# Patient Record
Sex: FEMALE | Race: AMERICAN INDIAN OR ALASKA NATIVE
[De-identification: names, ages, dates, MRNs, and addresses within clinical notes are randomized per-mention and may not be internally consistent; named-entity substitution may affect disease eponyms.]

---

## 2018-03-09 ENCOUNTER — HOSPITAL ENCOUNTER (INPATIENT)
Dept: HOSPITAL 5 - LD | Age: 38
LOS: 1 days | Discharge: HOME | DRG: 770 | End: 2018-03-10
Attending: OBSTETRICS & GYNECOLOGY | Admitting: OBSTETRICS & GYNECOLOGY
Payer: COMMERCIAL

## 2018-03-09 DIAGNOSIS — Z37.1: ICD-10-CM

## 2018-03-09 DIAGNOSIS — O03.1: Primary | ICD-10-CM

## 2018-03-09 DIAGNOSIS — Z23: ICD-10-CM

## 2018-03-09 LAB
BUN SERPL-MCNC: 7 MG/DL (ref 7–17)
BUN/CREAT SERPL: 10 %
CALCIUM SERPL-MCNC: 8.7 MG/DL (ref 8.4–10.2)
HCT VFR BLD CALC: 37.9 % (ref 30.3–42.9)
HEMOLYSIS INDEX: 11
HGB BLD-MCNC: 12.4 GM/DL (ref 10.1–14.3)
MCH RBC QN AUTO: 28 PG (ref 28–32)
MCHC RBC AUTO-ENTMCNC: 33 % (ref 30–34)
MCV RBC AUTO: 84 FL (ref 79–97)
PLATELET # BLD: 294 K/MM3 (ref 140–440)
RBC # BLD AUTO: 4.5 M/MM3 (ref 3.65–5.03)

## 2018-03-09 PROCEDURE — 85014 HEMATOCRIT: CPT

## 2018-03-09 PROCEDURE — 86592 SYPHILIS TEST NON-TREP QUAL: CPT

## 2018-03-09 PROCEDURE — 85007 BL SMEAR W/DIFF WBC COUNT: CPT

## 2018-03-09 PROCEDURE — 88305 TISSUE EXAM BY PATHOLOGIST: CPT

## 2018-03-09 PROCEDURE — 84702 CHORIONIC GONADOTROPIN TEST: CPT

## 2018-03-09 PROCEDURE — 85018 HEMOGLOBIN: CPT

## 2018-03-09 PROCEDURE — 85025 COMPLETE CBC W/AUTO DIFF WBC: CPT

## 2018-03-09 PROCEDURE — 86850 RBC ANTIBODY SCREEN: CPT

## 2018-03-09 PROCEDURE — 85027 COMPLETE CBC AUTOMATED: CPT

## 2018-03-09 PROCEDURE — 36415 COLL VENOUS BLD VENIPUNCTURE: CPT

## 2018-03-09 PROCEDURE — 80048 BASIC METABOLIC PNL TOTAL CA: CPT

## 2018-03-09 PROCEDURE — 86900 BLOOD TYPING SEROLOGIC ABO: CPT

## 2018-03-09 PROCEDURE — 86901 BLOOD TYPING SEROLOGIC RH(D): CPT

## 2018-03-09 NOTE — HISTORY AND PHYSICAL REPORT
History of Present Illness


Date of examination: 18


Date of admission: 


18 22:34





Chief complaint: 





something coming out of vagina 


History of present illness: 





This is a 38 yo  at 15 weeks came via ambulance as a walk in. she has a 

fetal sac protruding from her vagina. She stated that she went to her Doc 

office for numbness in right foot and finger numbness. She was directed to ER 

and left without being seen. In her pregnancy history at [place] has been to 

ER for vaginal bleeding dx with subchorionic bleeding. Today she experienced 

cramping and had a bowel movement and noticed something coming out. She has a 

hx of losses at 20 and 6 weeks. 





Past History


Past Medical History: no pertinent history


Past Surgical History: no surgical history


GYN History: chlamydia (in remote past )


Family/Genetic History: cancer


Social history: .  denies: smoking, alcohol abuse, prescription drug 

abuse





- Obstetrical History


: 3





Medications and Allergies


 Allergies











Allergy/AdvReac Type Severity Reaction Status Date / Time


 


No Known Allergies Allergy   Unverified 18 22:57











Active Meds: 


Active Medications





Oxytocin/Sodium Chloride (Pitocin/Ns 30 Unit/500ml)  30,000 milliunits in 500 

mls @ 2 mls/hr IV AS DIRECT ONE; Protocol


   Stop: 18 09:20











Review of Systems


All systems: negative


Genitourinary: other (bulging membranses with fetus intact)


Rectal Exam: deferred





- Physical Exam


Breasts: Positive: normal


Cardiovascular: Regular rate, Normal S1


Lungs: Positive: Clear to auscultation, Normal air movement


Abdomen: Positive: normal appearance, soft, normal bowel sounds.  Negative: 

distention, tenderness, guarding


Genitourinary (Female): Positive: normal external genitalia, normal perenium


Vulva: both: normal


Uterus: Positive: normal size


Anus/Rectum: Positive: normal perianal skin


Extremities: Positive: normal


Deep Tendon Reflex Grade: Normal +2





- Obstetrical


Uterine Contraction Pattern: Irregular


Uterine Tone Measurement Phase: Contraction


Uterine Contraction Intensity: Moderate





Results


Result Diagrams: 


 18 22:01





 18 22:01


 Abnormal lab results











  18 Range/Units





  22:01 22:01 22:01 


 


RDW  21.5 H    (13.2-15.2)  %


 


Carbon Dioxide   21 L   (22-30)  mmol/L


 


Glucose   107 H   ()  mg/dL


 


HCG, Quant    91810 H  (0-4)  mIU/mL








All other labs normal.








Assessment and Plan


A/P IUP 15 weeks


       inevitable miscarriage 


       RPL


       





       IVF, labs


       spoke with on call physicain awaiting records


       consider pitocin for delivery 


       counseled patient regarding the non viable status and inevitable status 


       await exp vaginal delivery 


       understands risk of possible d and c for possible retained placenta

## 2018-03-09 NOTE — EMERGENCY DEPARTMENT REPORT
HPI





- General


Time Seen by Provider: 18 21:52





- HPI


HPI: 





Room 18





The patient is a 37-year-old female presenting with a chief complaint of 

possible miscarriage.  The patient states she had pelvic ultrasound performed 

yesterday which showed normal fetal heart rate with normal fetal movement.  The 

patient was placed at approximately 15-16 weeks gestational age.  The patient 

states today she attempted to have a bowel movement and after the bowel 

movement when she felt something pass from her vagina.  The patient comes to 

the ED for evaluation.





Location: Pelvis


Duration: Just prior to arrival


Quality:  [See above]


Severity:  [See above]


Modifying factors: [see above]


Context: [see above]


Mode of transportation: [not driving]





ED Past Medical Hx





- Past Medical History


Previous Medical History?: No





- Surgical History


Past Surgical History?: No





- Family History


Family history: no significant





- Social History


Smoking Status: Unknown if ever smoked


Substance Use Type: None





ED Review of Systems


ROS: 


Stated complaint: POSS MISCARRIAGE


Other details as noted in HPI





Genitourinary: other (products of conception being passed vaginally)





Physical Exam





- Physical Exam


Physical Exam: 





GENERAL: The patient is well-developed well-nourished female lying on stretcher 

appearing to be in mild discomfort. []


HEENT: Normocephalic.  Atraumatic.  Extraocular motions are intact.  Patient 

has moist mucous membranes.


NECK: Supple.  Trachea midline


CHEST/LUNGS: There is no respiratory distress noted.


HEART/CARDIOVASCULAR: Regular.  There is no tachycardia.  There is no gallop 

rub or murmur.


ABDOMEN: There is no abdominal distention.


SKIN: There is no rash.  There is no edema.  There is no diaphoresis.


NEURO: The patient is awake, alert, and oriented.  The patient is cooperative.  

The patient has normal speech


MUSCULOSKELETAL: There is no evidence of acute injury.





ED Course





- Consultations


Consultation #1: 





18 21:54


OB/GYN paged


18 22:05


Case discussed with Dr. Rica Reyna- requests patient be sent to labor and 

delivery





ED Medical Decision Making





- Differential Diagnosis


incomplete 


Critical care attestation.: 


If time is entered above; I have spent that time in minutes in the direct care 

of this critically ill patient, excluding procedure time.








ED Disposition


Clinical Impression: 


 Inevitable 





Disposition:  OP ADMIT IP TO THIS HOSP


Is pt being admited?: Yes


Does the pt Need Aspirin: No


Condition: Stable


Time of Disposition: 22:06 (send to labor and delivery)

## 2018-03-10 VITALS — DIASTOLIC BLOOD PRESSURE: 61 MMHG | SYSTOLIC BLOOD PRESSURE: 98 MMHG

## 2018-03-10 LAB
ANISOCYTOSIS BLD QL SMEAR: (no result)
BAND NEUTROPHILS # (MANUAL): 0 K/MM3
BASOPHILS # (AUTO): 0.1 K/MM3 (ref 0–0.1)
BASOPHILS NFR BLD AUTO: 0.4 % (ref 0–1.8)
EOSINOPHIL # BLD AUTO: 0.1 K/MM3 (ref 0–0.4)
EOSINOPHIL NFR BLD AUTO: 0.5 % (ref 0–4.3)
HCT VFR BLD CALC: 26.2 % (ref 30.3–42.9)
HCT VFR BLD CALC: 29.6 % (ref 30.3–42.9)
HCT VFR BLD CALC: 37.9 % (ref 30.3–42.9)
HGB BLD-MCNC: 12.6 GM/DL (ref 10.1–14.3)
HGB BLD-MCNC: 8.4 GM/DL (ref 10.1–14.3)
HGB BLD-MCNC: 9.7 GM/DL (ref 10.1–14.3)
LYMPHOCYTES # BLD AUTO: 1.6 K/MM3 (ref 1.2–5.4)
LYMPHOCYTES NFR BLD AUTO: 11.6 % (ref 13.4–35)
MCH RBC QN AUTO: 27 PG (ref 28–32)
MCH RBC QN AUTO: 28 PG (ref 28–32)
MCH RBC QN AUTO: 28 PG (ref 28–32)
MCHC RBC AUTO-ENTMCNC: 32 % (ref 30–34)
MCHC RBC AUTO-ENTMCNC: 33 % (ref 30–34)
MCHC RBC AUTO-ENTMCNC: 33 % (ref 30–34)
MCV RBC AUTO: 85 FL (ref 79–97)
MONOCYTES # (AUTO): 0.7 K/MM3 (ref 0–0.8)
MONOCYTES % (AUTO): 5.5 % (ref 0–7.3)
MYELOCYTES # (MANUAL): 0 K/MM3
OVALOCYTES BLD QL SMEAR: (no result)
PLATELET # BLD: 239 K/MM3 (ref 140–440)
PLATELET # BLD: 253 K/MM3 (ref 140–440)
PLATELET # BLD: 293 K/MM3 (ref 140–440)
PROMYELOCYTES # (MANUAL): 0 K/MM3
RBC # BLD AUTO: 3.08 M/MM3 (ref 3.65–5.03)
RBC # BLD AUTO: 3.5 M/MM3 (ref 3.65–5.03)
RBC # BLD AUTO: 4.49 M/MM3 (ref 3.65–5.03)
TOTAL CELLS COUNTED BLD: 100

## 2018-03-10 PROCEDURE — 10D17ZZ EXTRACTION OF PRODUCTS OF CONCEPTION, RETAINED, VIA NATURAL OR ARTIFICIAL OPENING: ICD-10-PCS | Performed by: OBSTETRICS & GYNECOLOGY

## 2018-03-10 PROCEDURE — 3E0234Z INTRODUCTION OF SERUM, TOXOID AND VACCINE INTO MUSCLE, PERCUTANEOUS APPROACH: ICD-10-PCS | Performed by: OBSTETRICS & GYNECOLOGY

## 2018-03-10 RX ADMIN — IBUPROFEN SCH MG: 600 TABLET, FILM COATED ORAL at 03:46

## 2018-03-10 RX ADMIN — IBUPROFEN SCH: 600 TABLET, FILM COATED ORAL at 11:30

## 2018-03-10 NOTE — PROGRESS NOTE
Assessment and Plan


A/P IUP 15 weeks


       inevitable miscarriage 


       RPL


       s/p D and c 


       acute blood loss PP hemorrhage 


       





       VSS


       H/H 12.6/37.9--9.7/29.6 ( iron) 


       Bleeding minimal


       repeat cbc stat now 


       d/c home with f/u in 2 weeks 





Subjective





- Subjective


Date of service: 03/10/18


Principal diagnosis: s/p inevitable miscarriage, D&C 


Interval history: 





This is a 36 yo  at 15 weeks came via ambulance as a walk in. she has a 

fetal sac protruding from her vagina. She stated that she went to her Doc 

office for numbness in right foot and finger numbness. She was directed to ER 

and left without being seen. In her pregnancy history at [place] has been to 

ER for vaginal bleeding dx with subchorionic bleeding. Today she experienced 

cramping and had a bowel movement and noticed something coming out. She has a 

hx of losses at 20 and 6 weeks. 


Patient reports: appetite normal, voiding normally, pain well controlled, 

ambulating normally, no dizzy ambulation


: 





Objective





- Vital Signs


Latest vital signs: 


 Vital Signs











  Temp Pulse Resp BP BP Pulse Ox


 


 03/10/18 08:30  97.5 F L  94 H  18  96/58   100


 


 03/10/18 03:00  98.6 F  92 H  20   116/54 


 


 03/10/18 02:30   74  11 L  106/61   100


 


 03/10/18 02:25  98.0 F  82  18  113/38   100


 


 03/10/18 02:20   85  21  98/47   100


 


 03/10/18 02:15    19  100/64   100


 


 03/10/18 02:13   76  15  100/61   100


 


 03/10/18 02:05     93/35   97


 


 03/10/18 02:00   75  19  98/52   100


 


 03/10/18 01:55   75  18  92/48   100


 


 03/10/18 01:50   76  20  87/46   100


 


 03/10/18 01:45   75  13  78/40   100


 


 03/10/18 01:40   74  19  77/42   100


 


 03/10/18 01:35   79  20  78/42   100


 


 03/10/18 01:30   81  22  93/49   100


 


 03/10/18 01:26   82  21    100


 


 03/10/18 01:25   80  23  98/49   100


 


 03/10/18 01:21  98.3 F  91 H  19  93/58   100


 


 03/10/18 01:20   97 H  13    98


 


 03/10/18 00:42   100 H   98/61  


 


 03/10/18 00:37   82   92/59  


 


 03/10/18 00:31   96 H   61/38   95


 


 03/10/18 00:16   109 H   99/54  


 


 03/10/18 00:12  99.2 F   22   


 


 18 23:41   89   113/60  


 


 18 21:50       99








 Intake and Output











 03/09/18 03/10/18 03/10/18





 23:59 07:59 15:59


 


Intake Total  1400 


 


Output Total  300 


 


Balance  1100 


 


Intake:   


 


  IV  1400 


 


Output:   


 


  Urine  300 


 


    Void  300 


 


Other:   


 


  Total, Output Amount  300 


 


  # Voids   


 


    Void  1 


 


  Weight 88.451 kg  


 


  Estimated Blood Loss  800 














- Exam


Breasts: Present: normal


Cardiovascular: Present: Regular rate, Normal S1


Lungs: Present: Clear to auscultation, Normal air movement


Abdomen: Present: normal appearance, soft, normal bowel sounds.  Absent: 

distention, tenderness, guarding


Vulva: both: normal


Uterus: Present: normal, firm, fundal height below umbilicus.  Absent: bogginess

, tenderness


Extremities: Present: normal


Deep Tendon Reflex Grade: Normal +2





- Labs


Labs: 


 Abnormal lab results











  18 Range/Units





  22:01 22:01 22:01 


 


WBC     (4.5-11.0)  K/mm3


 


RBC     (3.65-5.03)  M/mm3


 


Hgb     (10.1-14.3)  gm/dl


 


Hct     (30.3-42.9)  %


 


RDW  21.5 H    (13.2-15.2)  %


 


Monocytes % (Manual)  11.0 H    (0.0-7.3)  %


 


Monocytes # (Manual)  1.0 H    (0.0-0.8)  K/mm3


 


Carbon Dioxide   21 L   (22-30)  mmol/L


 


Glucose   107 H   ()  mg/dL


 


HCG, Quant    68846 H  (0-4)  mIU/mL














  03/09/18 03/10/18 Range/Units





  23:41 01:40 


 


WBC   13.8 H  (4.5-11.0)  K/mm3


 


RBC   3.50 L  (3.65-5.03)  M/mm3


 


Hgb   9.7 L  (10.1-14.3)  gm/dl


 


Hct   29.6 L D  (30.3-42.9)  %


 


RDW  21.5 H  21.0 H  (13.2-15.2)  %


 


Monocytes % (Manual)    (0.0-7.3)  %


 


Monocytes # (Manual)    (0.0-0.8)  K/mm3


 


Carbon Dioxide    (22-30)  mmol/L


 


Glucose    ()  mg/dL


 


HCG, Quant    (0-4)  mIU/mL

## 2018-03-10 NOTE — PROCEDURE NOTE
OB Delivery Note





- Delivery


Date of Delivery: 03/10/18


Surgeon: CRYSTAL WATTS


Estimated blood loss: 500cc





- Vaginal


Delivery presentation: vertex


Delivery position: OA


Intrapartum events:  labor-<37 weeks, postpartum hemorrhage


Delivery induction: none


Delivery monitor: none


Route of delivery: 


Delivery placenta: other (see op note )


Episiotomy: none


Delivery laceration: none


Anesthesia: none


Delivery comments: 


Patient was noted to have sac and baby extruded with no heart beat baby 

delivered in sac intact. Non viable male infant delivered.  Cord noted in the 

sac with the baby. Patient experienced bleeding excessive with low BP and it 

was decided to proceed with suction dilatation and curettage. 








- Infant


  ** A


Apgar at 1 minute: 0


Apgar at 5 minutes: 0


Infant Gender: Male

## 2018-03-10 NOTE — ANESTHESIA CONSULTATION
Anesthesia Consult and Med Hx


Date of service: 03/10/18





- Airway


Anesthetic Teeth Evaluation: Good


ROM Head & Neck: Adequate


Mental/Hyoid Distance: Adequate


Mallampati Class: Class I


Intubation Access Assessment: Good





- Pulmonary Exam


CTA: Yes





- Cardiac Exam


Cardiac Exam: RRR





- Pre-Operative Health Status


ASA Pre-Surgery Classification: ASA1, Emergency


Proposed Anesthetic Plan: MAC





- Pulmonary


Hx Asthma: No


COPD: No


Hx Pneumonia: No





- Cardiovascular System


Hx Hypertension: No





- Central Nervous System


Hx Seizures: No


Hx Psychiatric Problems: No





- Endocrine


Hx Renal Disease: No


Hx End Stage Renal Disease: No


Hx Hypothyroidism: No


Hx Hyperthyroidism: No





- Hematic


Hx Anemia: No


Hx Sickle Cell Disease: No





- Other Systems


Hx Alcohol Use: No

## 2018-03-10 NOTE — OPERATIVE REPORT
Operative Report


Operative Report: 





DATE OF PROCEDURE: 03/10/18


PREOPERATIVE DIAGNOSIS: Retained placenta; postpartum hemmorrhage


POSTOPERATIVE DIAGNOSIS: Retained placenta; postpartum hemorrhage


PROCEDURE PERFORMED: Suction dilation and curettage.


SURGEON: Rica Reyna MD


SEDATION: MAC


COMPLICATIONS: None.


ESTIMATED BLOOD LOSS: 300cc


FINDINGS: Large placenta tissue intact 


DESCRIPTION OF PROCEDURE: The patient was taken to the operating room where MAC 

was performed without difficulty. She was prepped and draped in normal sterile 

fashion in dorsal lithotomy position. [place] speculum was placed in the patient

s vagina. The anterior lip of the cervix was grasped with uterine forceps. 

Uterus was already dilated and a#14 suction tip used after removing intact 

tissue from vagina intact . The suction machine was turned on and two passes of 

the suction curette were used to clear the uterus. . The suction curette was 

placed one more time to suction any products that were left. Once this was 

accomplished, the procedure was terminated. All instruments were removed from 

the patients vagina. The patient was allowed to wake up and went to the 

recovery room awake, alert, and oriented x4, in stable condition. There was 

minimal blood loss.

## 2018-03-10 NOTE — ANESTHESIA DAY OF SURGERY
Anesthesia Day of Surgery





- Day of Surgery


Patient Examined: Yes


Patient H&P Reviewed: Yes


Patient is NPO: No (emergency)

## 2018-03-10 NOTE — DISCHARGE SUMMARY
Providers





- Providers


Date of Admission: 


18 22:34





Date of discharge: 03/10/18


Attending physician: 


MILY KULKARNI





Primary care physician: 


MILY KULKARNI








Hospitalization


Reason for admission: other (inevitable ab)


Delivery: 


Procedure: other (D&C)


Episiotomy: none


Laceration: none


Incision: normal


Other postpartum procedures: curettage


Postpartum complications: retained placenta


Discharge diagnosis: other (inevitable ab with delivery  and dilatation and 

curretage, retained placenta, hemorrhage ( acute)  )


Condition at discharge: Good


Disposition: DC-01 TO HOME OR SELFCARE





Plan





- Provider Discharge Summary


Activity: routine, no sex for 6 weeks, no strenuous exercise


Diet: routine


Instructions: routine


Additional instructions: 


[]  Smoking cessation referral if applicable(refer to patient education folder 

for contact #)


[]  Refer to [facility] Booklet








Call your doctor immediately for:


* Fever > 100.5


* Heavy vaginal bleeding ( >1 pad per hour)


* Severe persistent headache


* Shortness of breath


* Reddened, hot, painful area to leg or breast


* Drainage or odor from incision.





* Keep incision clean and dry at all times and follow doctor's instructions 

regarding bathing/showering











- Follow up plan